# Patient Record
Sex: MALE | URBAN - METROPOLITAN AREA
[De-identification: names, ages, dates, MRNs, and addresses within clinical notes are randomized per-mention and may not be internally consistent; named-entity substitution may affect disease eponyms.]

---

## 2019-12-16 ENCOUNTER — APPOINTMENT (RX ONLY)
Dept: URBAN - METROPOLITAN AREA CLINIC 291 | Facility: CLINIC | Age: 59
Setting detail: DERMATOLOGY
End: 2019-12-16

## 2019-12-16 DIAGNOSIS — D22 MELANOCYTIC NEVI: ICD-10-CM

## 2019-12-16 DIAGNOSIS — L82.1 OTHER SEBORRHEIC KERATOSIS: ICD-10-CM

## 2019-12-16 DIAGNOSIS — L57.3 POIKILODERMA OF CIVATTE: ICD-10-CM

## 2019-12-16 DIAGNOSIS — D18.0 HEMANGIOMA: ICD-10-CM

## 2019-12-16 DIAGNOSIS — L57.0 ACTINIC KERATOSIS: ICD-10-CM

## 2019-12-16 PROBLEM — D22.5 MELANOCYTIC NEVI OF TRUNK: Status: ACTIVE | Noted: 2019-12-16

## 2019-12-16 PROBLEM — D22.62 MELANOCYTIC NEVI OF LEFT UPPER LIMB, INCLUDING SHOULDER: Status: ACTIVE | Noted: 2019-12-16

## 2019-12-16 PROBLEM — D22.61 MELANOCYTIC NEVI OF RIGHT UPPER LIMB, INCLUDING SHOULDER: Status: ACTIVE | Noted: 2019-12-16

## 2019-12-16 PROBLEM — D18.01 HEMANGIOMA OF SKIN AND SUBCUTANEOUS TISSUE: Status: ACTIVE | Noted: 2019-12-16

## 2019-12-16 PROCEDURE — 17003 DESTRUCT PREMALG LES 2-14: CPT

## 2019-12-16 PROCEDURE — ? PRESCRIPTION

## 2019-12-16 PROCEDURE — ? COUNSELING

## 2019-12-16 PROCEDURE — 99202 OFFICE O/P NEW SF 15 MIN: CPT | Mod: 25

## 2019-12-16 PROCEDURE — 17000 DESTRUCT PREMALG LESION: CPT

## 2019-12-16 PROCEDURE — ? LIQUID NITROGEN

## 2019-12-16 RX ORDER — CLINDAMYCIN PHOSPHATE 10 MG/ML
LOTION TOPICAL
Qty: 1 | Refills: 5 | Status: ERX | COMMUNITY
Start: 2019-12-16

## 2019-12-16 RX ORDER — CLINDAMYCIN PHOSPHATE 10 MG/ML
LOTION TOPICAL
Qty: 1 | Refills: 5 | Status: ERX

## 2019-12-16 RX ORDER — FLUOCINOLONE ACETONIDE 0.25 MG/G
OINTMENT TOPICAL
Qty: 1 | Refills: 3 | Status: ERX

## 2019-12-16 RX ORDER — FLUOCINOLONE ACETONIDE 0.25 MG/G
OINTMENT TOPICAL
Qty: 1 | Refills: 3 | Status: ERX | COMMUNITY
Start: 2019-12-16

## 2019-12-16 RX ADMIN — FLUOCINOLONE ACETONIDE: 0.25 OINTMENT TOPICAL at 00:00

## 2019-12-16 RX ADMIN — CLINDAMYCIN PHOSPHATE: 10 LOTION TOPICAL at 00:00

## 2019-12-16 ASSESSMENT — LOCATION DETAILED DESCRIPTION DERM
LOCATION DETAILED: LEFT ANTERIOR SHOULDER
LOCATION DETAILED: RIGHT SUPERIOR ANTERIOR NECK
LOCATION DETAILED: LEFT SUPERIOR ANTERIOR NECK
LOCATION DETAILED: LEFT PROXIMAL POSTERIOR UPPER ARM
LOCATION DETAILED: NASAL DORSUM
LOCATION DETAILED: RIGHT MID TEMPLE
LOCATION DETAILED: RIGHT PROXIMAL POSTERIOR UPPER ARM
LOCATION DETAILED: LEFT PROXIMAL LATERAL POSTERIOR UPPER ARM
LOCATION DETAILED: RIGHT MEDIAL EYEBROW
LOCATION DETAILED: SUPERIOR THORACIC SPINE

## 2019-12-16 ASSESSMENT — LOCATION ZONE DERM
LOCATION ZONE: NOSE
LOCATION ZONE: NECK
LOCATION ZONE: ARM
LOCATION ZONE: TRUNK
LOCATION ZONE: FACE

## 2019-12-16 ASSESSMENT — LOCATION SIMPLE DESCRIPTION DERM
LOCATION SIMPLE: RIGHT ANTERIOR NECK
LOCATION SIMPLE: LEFT UPPER ARM
LOCATION SIMPLE: RIGHT EYEBROW
LOCATION SIMPLE: LEFT ANTERIOR NECK
LOCATION SIMPLE: RIGHT UPPER ARM
LOCATION SIMPLE: NOSE
LOCATION SIMPLE: UPPER BACK
LOCATION SIMPLE: RIGHT TEMPLE
LOCATION SIMPLE: LEFT SHOULDER

## 2019-12-16 NOTE — HPI: RASH
What Type Of Note Output Would You Prefer (Optional)?: Standard Output
Is This A New Presentation, Or A Follow-Up?: Rash
Additional History: Patient reports the rash is irritated most after shaving. He has tried several different shaving products.

## 2021-06-24 ENCOUNTER — HOSPITAL ENCOUNTER (EMERGENCY)
Dept: HOSPITAL 56 - MW.ED | Age: 61
Discharge: HOME | End: 2021-06-24
Payer: COMMERCIAL

## 2021-06-24 DIAGNOSIS — S00.03XA: Primary | ICD-10-CM

## 2021-06-24 DIAGNOSIS — E87.6: ICD-10-CM

## 2021-06-24 DIAGNOSIS — S50.311A: ICD-10-CM
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
## 2021-06-24 DIAGNOSIS — R42: ICD-10-CM

## 2021-06-24 DIAGNOSIS — S40.211A: ICD-10-CM
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
## 2021-06-24 LAB
BUN SERPL-MCNC: 12 MG/DL (ref 7–18)
CHLORIDE SERPL-SCNC: 105 MMOL/L (ref 98–107)
CO2 SERPL-SCNC: 28 MMOL/L (ref 21–32)
GLUCOSE SERPL-MCNC: 98 MG/DL (ref 74–106)
POTASSIUM SERPL-SCNC: 3.4 MMOL/L (ref 3.5–5.1)
SODIUM SERPL-SCNC: 142 MMOL/L (ref 136–148)

## 2021-06-24 NOTE — EDM.PDOC
ED HPI GENERAL MEDICAL PROBLEM





- General


Chief Complaint: General


Stated Complaint: FELL AT WORK


Time Seen by Provider: 06/24/21 18:58





- History of Present Illness


INITIAL COMMENTS - FREE TEXT/NARRATIVE: 





History of present illness:


[]


61-year-old healthy working man was working on a roof.  He lost his balance and 

fell into another slant on the roof.  He did not fall off the roof.  He had no 

LOC.  He was little dazed primarily after he hit the right side of his temporal 

scalp.  He has abrasions on his right shoulder right elbow and right knee.  He 

has not had any nausea or vomiting.  He does not have any disorientation.  He 

has had a couple of episodes of orthostatic dizziness since that happened.  He 

denies dehydration says it was more pleasant and recent temperature and humidity

 conditions.  He denies any recent blood loss or black stool.


Review of systems: 


As per history of present illness and below otherwise all systems reviewed and 

negative.





Past medical history: 


As per history of present illness and as reviewed below otherwise 

noncontributory.





Surgical history: 


As per history of present illness and as reviewed below otherwise 

noncontributory.





Social history: 


No reported history of drug or alcohol abuse.





Family history: 


As per history of present illness and as reviewed below otherwise 

noncontributory.





Physical exam:


Constitutional - well developed, well-nourished and in no acute distress


HEENT - normocephalic, no evidence of trauma - external nose and mouth normal - 

no mass in neck and no JVD - mucosae moist





EYES - full EOM, PERRL, no icterus - no evidence of inflammation, injection, or 

drainage





Respiratory - no respiratory distress, equal bilateral expansion, lungs clear to

 auscultation and no abnormal lung sounds





Cardiovascular - Regular Rhythm with S1 and S2 appreciated and no murmur, gallop

 or rub.





GI - abdomen soft without distension or organomegaly - normal bowel sounds - no 

guard or rebound





Musculoskeletal  no gross deformity of long bones or joints - no tenderness, swe

lling or edema





Neurologic - Alert and oriented times four - CN II-XII grossly intact - motor 

sensory and coordination symmetrically normal





Psychiatric - appropriate mood and affect with normal thought content





Hematologic - No petechiae or purpura - mucosa appropriate color and sclera not 

pale - normal nail bed color and refill





Integument -abrasions right lateral shoulder, right elbow just distal to the 

joint on the ulnar area and right anterior knee.  Otherwise no rash or evidence 

of trauma - normal turgor





Diagnostics:


[]





Therapeutics:


[]





Impression: 


[]





Plan:


[]





Definitive disposition and diagnosis as appropriate pending reevaluation and 

review of above.








  ** Generalized


Pain Score (Numeric/FACES): 2





- Related Data


                                    Allergies











Allergy/AdvReac Type Severity Reaction Status Date / Time


 


No Known Allergies Allergy   Verified 06/24/21 19:13











Home Meds: 


                                    Home Meds





. [No Known Home Meds]  06/24/21 [History]











Social & Family History





- Tobacco Use


Tobacco Use Status *Q: Never Tobacco User


Second Hand Smoke Exposure: No





- Caffeine Use


Caffeine Use: Reports: None





- Recreational Drug Use


Recreational Drug Use: No





ED ROS GENERAL





- Review of Systems


Review Of Systems: Comprehensive ROS is negative, except as noted in HPI.





ED EXAM, GENERAL





- Physical Exam


Exam: See Below


Free Text/Narrative:: 





My physical exam is in the HPI





Course





- Vital Signs


Last Recorded V/S: 


                                Last Vital Signs











Temp  36.3 C   06/24/21 19:09


 


Pulse  83   06/24/21 19:09


 


Resp  16   06/24/21 19:09


 


BP  138/84   06/24/21 19:09


 


Pulse Ox  98   06/24/21 19:09








                                        





Orthostatic Blood Pressure [     140/93


Standing]                        


Orthostatic Blood Pressure [     138/81


Sitting]                         


Orthostatic Blood Pressure [     144/87


Supine]                          











- Orders/Labs/Meds


Orders: 


                               Active Orders 24 hr











 Category Date Time Status


 


 Communication Order [RC] STAT Care  06/24/21 19:18 Active


 


 Orthostatic Vital Signs [RC] ASDIRECTED Care  06/24/21 19:21 Active











Labs: 


                                Laboratory Tests











  06/24/21 06/24/21 Range/Units





  19:26 19:26 


 


WBC  8.04   (4.0-11.0)  K/uL


 


RBC  4.92   (4.50-5.90)  M/uL


 


Hgb  15.2   (13.0-17.0)  g/dL


 


Hct  45.1   (38.0-50.0)  %


 


MCV  91.7   (80.0-98.0)  fL


 


MCH  30.9   (27.0-32.0)  pg


 


MCHC  33.7   (31.0-37.0)  g/dL


 


RDW Std Deviation  41.6   (28.0-62.0)  fl


 


RDW Coeff of Mariam  13   (11.0-15.0)  %


 


Plt Count  212   (150-400)  K/uL


 


MPV  10.80   (7.40-12.00)  fL


 


Neut % (Auto)  70.4   (48.0-80.0)  %


 


Lymph % (Auto)  19.5   (16.0-40.0)  %


 


Mono % (Auto)  7.8   (0.0-15.0)  %


 


Eos % (Auto)  1.9   (0.0-7.0)  %


 


Baso % (Auto)  0.4   (0.0-1.5)  %


 


Neut # (Auto)  5.7   (1.4-5.7)  K/uL


 


Lymph # (Auto)  1.6   (0.6-2.4)  K/uL


 


Mono # (Auto)  0.6   (0.0-0.8)  K/uL


 


Eos # (Auto)  0.2   (0.0-0.7)  K/uL


 


Baso # (Auto)  0.0   (0.0-0.1)  K/uL


 


Sodium   142  (136-148)  mmol/L


 


Potassium   3.4 L  (3.5-5.1)  mmol/L


 


Chloride   105  ()  mmol/L


 


Carbon Dioxide   28.0  (21.0-32.0)  mmol/L


 


BUN   12  (7.0-18.0)  mg/dL


 


Creatinine   1.0  (0.8-1.3)  mg/dL


 


Est Cr Clr Drug Dosing   86.22  mL/min


 


Estimated GFR (MDRD)   > 60.0  ml/min


 


Glucose   98  ()  mg/dL


 


Calcium   8.7  (8.5-10.1)  mg/dL


 


Total Bilirubin   0.9  (0.2-1.0)  mg/dL


 


AST   21  (15-37)  IU/L


 


ALT   25  (14-63)  IU/L


 


Alkaline Phosphatase   77  ()  U/L


 


Total Protein   8.0  (6.4-8.2)  g/dL


 


Albumin   4.1  (3.4-5.0)  g/dL


 


Globulin   3.9  (2.6-4.0)  g/dL


 


Albumin/Globulin Ratio   1.1  (0.9-1.6)  














Departure





- Departure


Time of Disposition: 20:12


Disposition: Home, Self-Care 01


Condition: Good


Clinical Impression: 


 Fall, Contusion of scalp, Multiple abrasions, Dizziness, Hypokalemia








- Discharge Information


Instructions:  Hypokalemia, Dizziness, Easy-to-Read, Facial or Scalp Contusion, 

Easy-to-Read


Referrals: 


PCP,Not In Area [Primary Care Provider] - 


Forms:  ED Department Discharge


Additional Instructions: 


Your potassium was slightly low.  Nothing further than diet modification is 

necessary at this point.  Its not low enough to probably cause your symptoms.  

It is vitally important his lungs to have a headache after having a bump on the 

head that you be careful not to be injured again during that time.








[facility]


[address] [place] [id number]


Phone: [phone]


Fax: [phone]








[facility]


[address] [place] [id number]


Phone: [phone]


Fax: [phone]











The following information is given to patients seen in the emergency department 

who are being discharged to home. This information is to outline your options 

for follow-up care. We provide all patients seen in our emergency department 

with a follow-up referral.





The need for follow-up, as well as the timing and circumstances, are variable 

depending upon the specifics of your emergency department visit.





If you don't have a primary care physician on staff, we will provide you with a 

referral. We always advise you to contact your personal physician following an 

emergency department visit to inform them of the circumstance of the visit and 

for follow-up with them and/or the need for any referrals to a consulting 

specialist.





The emergency department will also refer you to a specialist when appropriate. 

This referral assures that you have the opportunity for follow-up care with a 

specialist. All of these measure are taken in an effort to provide you with 

optimal care, which includes your follow-up.





Under all circumstances we always encourage you to contact your private 

physician who remains a resource for coordinating your care. When calling for 

follow-up care, please make the office aware that this follow-up is from your 

recent emergency room visit. If for any reason you are refused follow-up, please

contact the [facility]

at [phone] and asked to speak to the emergency department charge nurse.








Sepsis Event Note (ED)





- Evaluation


Sepsis Screening Result: No Definite Risk





- Focused Exam


Vital Signs: 


                                   Vital Signs











  Temp Pulse Resp BP Pulse Ox


 


 06/24/21 19:09  36.3 C  83  16  138/84  98














- My Orders


Last 24 Hours: 


My Active Orders





06/24/21 19:18


Communication Order [RC] STAT 





06/24/21 19:21


Orthostatic Vital Signs [RC] ASDIRECTED 














- Assessment/Plan


Last 24 Hours: 


My Active Orders





06/24/21 19:18


Communication Order [RC] STAT 





06/24/21 19:21


Orthostatic Vital Signs [RC] ASDIRECTED

## 2021-06-24 NOTE — CT
INDICATION:



Right temporal trauma with dizziness



TECHNIQUE:



Head CT without contrast.



COMPARISON:



None 



FINDINGS:



CSF spaces: Within normal limits for age.  



Brain parenchyma: Normal gray-white junction. No sign of mass, hemorrhage, 

or midline shift.  



Skull base and calvarium: The visualized paranasal sinuses and mastoid air 

cells demonstrate no acute or significant findings.  The visualized orbits 

are grossly unremarkable.  No skull fractures.    



IMPRESSION:



No acute findings.



Please note that all CT scans at this facility use dose modulation, 

iterative reconstruction, and/or weight-based dosing when appropriate to 

reduce radiation dose to as low as reasonably achievable.



Dictated by Mariann Al MD @ 6/24/2021 8:05:45 PM



Signed by Dr. Mariann Al @ Jun 24 2021  8:05PM